# Patient Record
Sex: MALE | Race: WHITE | URBAN - METROPOLITAN AREA
[De-identification: names, ages, dates, MRNs, and addresses within clinical notes are randomized per-mention and may not be internally consistent; named-entity substitution may affect disease eponyms.]

---

## 2018-08-29 ENCOUNTER — HISTORICAL (OUTPATIENT)
Dept: SURGERY | Facility: HOSPITAL | Age: 83
End: 2018-08-29

## 2022-04-30 NOTE — OP NOTE
DATE OF SURGERY:        SURGEON:  Tyrese Stern MD    PREOP DIAGNOSIS:  Carpal tunnel, left wrist.    POSTOP DIAGNOSIS:  Carpal tunnel, left wrist.    PROCEDURE PERFORMED:  Carpal tunnel release, left wrist.    ANESTHESIA:  Axillary block.    ESTIMATED BLOOD LOSS:  Minimal.    PROCEDURE IN DETAIL:  In the operating suite, and under axillary block anesthetic, the left arm was prepped and draped in a sterile fashion.  The arm was exsanguinated and tourniquet inflated to 250 mmHg.  The left palm was explored through a small proximal curved incision.  Skin and subcutaneous tissues were incised.  Bleeding controlled with Bovie cautery.  The median nerve was identified at the level of the distal wrist crease, and the carpal ligament was transected on the ulnar border of the canal throughout the entire length of the canal using a 69 [place] blade.  During dissection, care was taken to identify and preserve the recurrent motor branch.  I tunneled proximally on the distal forearm and released the subcutaneous fascia.  The epineurium surrounding the nerve was gently teased with fine pickups and forceps until the area of constriction was relieved.  The nerve was markedly flattened and almost transparent in the midportion of the canal.  Once the nerve was completely freed, the tourniquet was released.  Final hemostasis was obtained with Bovie cautery.  The skin incision was closed with interrupted sutures of 5-0 and 6-0 Prolene.  A sterile splint dressing was then applied, and the procedure terminated.        ______________________________  Tyrese Stern MD    [facility]  DD:  08/29/2018  Time:  09:54AM  DT:  08/29/2018  Time:  10:07AM  Job #:  [id number]    cc: Nolberto Arango __________